# Patient Record
Sex: MALE | Employment: FULL TIME | URBAN - METROPOLITAN AREA
[De-identification: names, ages, dates, MRNs, and addresses within clinical notes are randomized per-mention and may not be internally consistent; named-entity substitution may affect disease eponyms.]

---

## 2023-10-27 ENCOUNTER — LAB (OUTPATIENT)
Dept: LAB | Facility: LAB | Age: 43
End: 2023-10-27
Payer: COMMERCIAL

## 2023-10-27 ENCOUNTER — TELEPHONE (OUTPATIENT)
Dept: PRIMARY CARE | Facility: CLINIC | Age: 43
End: 2023-10-27

## 2023-10-27 DIAGNOSIS — L02.91 ABSCESS: ICD-10-CM

## 2023-10-27 DIAGNOSIS — R10.9 ABDOMINAL PAIN, UNSPECIFIED ABDOMINAL LOCATION: ICD-10-CM

## 2023-10-27 DIAGNOSIS — R10.9 ABDOMINAL PAIN, UNSPECIFIED ABDOMINAL LOCATION: Primary | ICD-10-CM

## 2023-10-27 LAB
ALBUMIN SERPL BCP-MCNC: 4.9 G/DL (ref 3.4–5)
ALP SERPL-CCNC: 78 U/L (ref 33–120)
ALT SERPL W P-5'-P-CCNC: 51 U/L (ref 10–52)
ANION GAP SERPL CALC-SCNC: 12 MMOL/L (ref 10–20)
AST SERPL W P-5'-P-CCNC: 33 U/L (ref 9–39)
BASOPHILS # BLD AUTO: 0.06 X10*3/UL (ref 0–0.1)
BASOPHILS NFR BLD AUTO: 0.8 %
BILIRUB SERPL-MCNC: 0.8 MG/DL (ref 0–1.2)
BUN SERPL-MCNC: 13 MG/DL (ref 6–23)
CALCIUM SERPL-MCNC: 10.1 MG/DL (ref 8.6–10.6)
CHLORIDE SERPL-SCNC: 103 MMOL/L (ref 98–107)
CO2 SERPL-SCNC: 29 MMOL/L (ref 21–32)
CREAT SERPL-MCNC: 1.15 MG/DL (ref 0.5–1.3)
CRP SERPL-MCNC: 4.7 MG/DL
EOSINOPHIL # BLD AUTO: 0.13 X10*3/UL (ref 0–0.7)
EOSINOPHIL NFR BLD AUTO: 1.7 %
ERYTHROCYTE [DISTWIDTH] IN BLOOD BY AUTOMATED COUNT: 11.7 % (ref 11.5–14.5)
GFR SERPL CREATININE-BSD FRML MDRD: 81 ML/MIN/1.73M*2
GLUCOSE SERPL-MCNC: 107 MG/DL (ref 74–99)
HCT VFR BLD AUTO: 45.5 % (ref 41–52)
HGB BLD-MCNC: 15.5 G/DL (ref 13.5–17.5)
IMM GRANULOCYTES # BLD AUTO: 0.03 X10*3/UL (ref 0–0.7)
IMM GRANULOCYTES NFR BLD AUTO: 0.4 % (ref 0–0.9)
LYMPHOCYTES # BLD AUTO: 1.47 X10*3/UL (ref 1.2–4.8)
LYMPHOCYTES NFR BLD AUTO: 18.7 %
MCH RBC QN AUTO: 30.8 PG (ref 26–34)
MCHC RBC AUTO-ENTMCNC: 34.1 G/DL (ref 32–36)
MCV RBC AUTO: 90 FL (ref 80–100)
MONOCYTES # BLD AUTO: 0.78 X10*3/UL (ref 0.1–1)
MONOCYTES NFR BLD AUTO: 9.9 %
NEUTROPHILS # BLD AUTO: 5.39 X10*3/UL (ref 1.2–7.7)
NEUTROPHILS NFR BLD AUTO: 68.5 %
NRBC BLD-RTO: 0 /100 WBCS (ref 0–0)
PLATELET # BLD AUTO: 319 X10*3/UL (ref 150–450)
PMV BLD AUTO: 9.6 FL (ref 7.5–11.5)
POTASSIUM SERPL-SCNC: 5.1 MMOL/L (ref 3.5–5.3)
PROT SERPL-MCNC: 7.5 G/DL (ref 6.4–8.2)
RBC # BLD AUTO: 5.04 X10*6/UL (ref 4.5–5.9)
SODIUM SERPL-SCNC: 139 MMOL/L (ref 136–145)
WBC # BLD AUTO: 7.9 X10*3/UL (ref 4.4–11.3)

## 2023-10-27 PROCEDURE — 36415 COLL VENOUS BLD VENIPUNCTURE: CPT

## 2023-10-27 PROCEDURE — 86140 C-REACTIVE PROTEIN: CPT

## 2023-10-27 PROCEDURE — 85025 COMPLETE CBC W/AUTO DIFF WBC: CPT

## 2023-10-27 PROCEDURE — 80053 COMPREHEN METABOLIC PANEL: CPT

## 2023-10-27 NOTE — TELEPHONE ENCOUNTER
Pt called for blood work and appt  He previously had a liver infection and almost . He is experiencing the same symptoms now and would like a blood test asap. He is already fasting in hopes in can happen this morning.   Marck [phone]

## 2023-10-27 NOTE — TELEPHONE ENCOUNTER
Spoke with pt and he is aware his non fasting orders are in. His symptoms are night sweats that last a few hours, super tired and this happens out of the blue.   Pt stated these are the same symptoms from 3 years ago.

## 2023-10-30 DIAGNOSIS — R79.82 ELEVATED C-REACTIVE PROTEIN: ICD-10-CM

## 2023-10-30 DIAGNOSIS — Z00.00 HEALTH CARE MAINTENANCE: Primary | ICD-10-CM

## 2023-10-31 NOTE — TELEPHONE ENCOUNTER
Spoke with pt regarding c protein and informed him the sleep study will be discussed at his follow up visit.

## 2023-12-08 ENCOUNTER — OFFICE VISIT (OUTPATIENT)
Dept: PRIMARY CARE | Facility: CLINIC | Age: 43
End: 2023-12-08
Payer: COMMERCIAL

## 2023-12-08 VITALS
DIASTOLIC BLOOD PRESSURE: 90 MMHG | HEIGHT: 74 IN | RESPIRATION RATE: 16 BRPM | SYSTOLIC BLOOD PRESSURE: 120 MMHG | BODY MASS INDEX: 30.11 KG/M2 | WEIGHT: 234.6 LBS

## 2023-12-08 DIAGNOSIS — D49.2 NEOPLASM OF SKIN OF SCALP: ICD-10-CM

## 2023-12-08 DIAGNOSIS — L20.82 FLEXURAL ECZEMA: ICD-10-CM

## 2023-12-08 DIAGNOSIS — R06.83 SNORING: ICD-10-CM

## 2023-12-08 DIAGNOSIS — R79.82 ELEVATED C-REACTIVE PROTEIN: ICD-10-CM

## 2023-12-08 DIAGNOSIS — R73.09 ELEVATED GLUCOSE: ICD-10-CM

## 2023-12-08 DIAGNOSIS — H01.139 ECZEMA OF EYELID, UNSPECIFIED LATERALITY: ICD-10-CM

## 2023-12-08 DIAGNOSIS — G47.19 EXCESSIVE DAYTIME SLEEPINESS: ICD-10-CM

## 2023-12-08 DIAGNOSIS — Z00.00 HEALTH CARE MAINTENANCE: Primary | ICD-10-CM

## 2023-12-08 DIAGNOSIS — Z00.00 ENCOUNTER FOR PREVENTATIVE ADULT HEALTH CARE EXAMINATION: ICD-10-CM

## 2023-12-08 DIAGNOSIS — M62.838 MUSCLE SPASM: ICD-10-CM

## 2023-12-08 DIAGNOSIS — D49.2 NEOPLASM OF SKIN OF BACK: ICD-10-CM

## 2023-12-08 PROCEDURE — 99396 PREV VISIT EST AGE 40-64: CPT | Performed by: INTERNAL MEDICINE

## 2023-12-08 RX ORDER — TRIAMCINOLONE ACETONIDE 0.25 MG/G
OINTMENT TOPICAL
Qty: 80 G | Refills: 0 | Status: SHIPPED | OUTPATIENT
Start: 2023-12-08

## 2023-12-08 RX ORDER — CYCLOBENZAPRINE HCL 10 MG
10 TABLET ORAL 2 TIMES DAILY PRN
Qty: 60 TABLET | Refills: 0 | Status: SHIPPED | OUTPATIENT
Start: 2023-12-08

## 2023-12-08 ASSESSMENT — PAIN SCALES - GENERAL: PAINLEVEL: 0-NO PAIN

## 2023-12-08 ASSESSMENT — PATIENT HEALTH QUESTIONNAIRE - PHQ9
SUM OF ALL RESPONSES TO PHQ9 QUESTIONS 1 AND 2: 0
2. FEELING DOWN, DEPRESSED OR HOPELESS: NOT AT ALL
1. LITTLE INTEREST OR PLEASURE IN DOING THINGS: NOT AT ALL

## 2023-12-08 NOTE — PATIENT INSTRUCTIONS
Use triamcinolone ointment as needed for itching when it flares up.     For prevention/control: Can use a zinc oxide ointment to that area ( Desitin) and use as needed.  Is a skin protectant and helps to relieve inflammation and also kills bacteria, etc.     Get blood test done after fasting : cholesterol, sugar recheck, and recheck the c reactive protien.     Schedule with dermatology. Be sure to discuss your groin and eyelid issues.    I recommend getting a blood pressure monitor and checking periodically at home.  Goal is between 120-130  for systolic (top) and 80-85 for diastolic ( bottom).    See me one year for annual physical.

## 2023-12-08 NOTE — PROGRESS NOTES
"Subjective   Marck Crocker is a 43 y.o. male who presents for Annual physical     He had sudden onset of chills, fatigue and achiness ( joint aching ) and was concerned because this was similar to a prior liver abscess. 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   He does snore loudly. We discussed testing for sleep apnea , and that JESSICA can contribute to hypertension.    Concern over some moles on scalp and back    He has been having a persistent itch in his right groin crease   He is not scratching at night, but will have flares   Tried otc hydrocortisone, tinactin,  antibacterial soap  He does not have a rash right now  He also notes itching and redness of his eyelids and some skin redness , he does not have any of this right now    No chest pain  No shortness of breath  No constipation  No diarrhea  Review of Systems    Objective   /88 (BP Location: Right arm, Patient Position: Sitting, BP Cuff Size: Adult)   Resp 16   Ht 1.88 m (6' 2\")   Wt 106 kg (234 lb 9.6 oz)   BMI 30.12 kg/m²    Physical Exam  Recheck BP with manual cuff is 120/90  GEN: NAD  HEENT: normal  NECK: no adenopathy, no thyroid enlargment  LUNGS: CTAB  CV: reg S1/S2 no murmurs  ABD: soft, nontender, no mass or organomegaly  SKIN: he has several moles on his back, some flat red/pearly moles.  Also some scaly areas on scalp.  Some redness / bumps in his inguinal creases  EXT: no leg edema   Assessment/Plan   Problem List Items Addressed This Visit    None    Encounter for preventive health exam: his glucose was elevated at 107; Aic " in 2020 was 5.4. Check lipids, recheck fasting glucose and A1c.    Elevated blood pressure w/o htn: discussed need for exercise and wt loss, lower sodium diet.  Recommend checking BP at home and recording . He should call  if his BP are consistently elevated. Discussed getting sleep study but he did not want to do this at this time. Check cmp and lipids, TSH  Flexural eczema:  rx for triamcinolone to use for his groin itching. Also recommend zinc oxide ointment as skin protectant in those areas, this is otc.  Snoring: he didn't want s sleep study.   Scalp and back skin lesions: refer to dermatology.   He travels often for work and wishes to see me again in one year.

## 2024-01-24 ENCOUNTER — CLINICAL SUPPORT (OUTPATIENT)
Dept: SLEEP MEDICINE | Facility: HOSPITAL | Age: 44
End: 2024-01-24
Payer: COMMERCIAL

## 2024-01-24 DIAGNOSIS — R06.83 SNORING: ICD-10-CM

## 2024-01-24 DIAGNOSIS — G47.19 EXCESSIVE DAYTIME SLEEPINESS: ICD-10-CM

## 2024-01-24 PROCEDURE — 95806 SLEEP STUDY UNATT&RESP EFFT: CPT | Performed by: ALLERGY & IMMUNOLOGY

## 2024-02-08 DIAGNOSIS — G47.33 OBSTRUCTIVE SLEEP APNEA: Primary | ICD-10-CM

## 2024-11-26 ENCOUNTER — OFFICE VISIT (OUTPATIENT)
Dept: URGENT CARE | Age: 44
End: 2024-11-26
Payer: COMMERCIAL

## 2024-11-26 VITALS
TEMPERATURE: 97.3 F | OXYGEN SATURATION: 95 % | WEIGHT: 235 LBS | DIASTOLIC BLOOD PRESSURE: 92 MMHG | HEART RATE: 75 BPM | SYSTOLIC BLOOD PRESSURE: 137 MMHG | BODY MASS INDEX: 30.17 KG/M2 | RESPIRATION RATE: 16 BRPM

## 2024-11-26 DIAGNOSIS — J34.9 SINUS PROBLEM: ICD-10-CM

## 2024-11-26 DIAGNOSIS — J01.00 ACUTE NON-RECURRENT MAXILLARY SINUSITIS: Primary | ICD-10-CM

## 2024-11-26 RX ORDER — BENZONATATE 100 MG/1
CAPSULE ORAL
Qty: 60 CAPSULE | Refills: 0 | Status: SHIPPED | OUTPATIENT
Start: 2024-11-26

## 2024-11-26 RX ORDER — AMOXICILLIN AND CLAVULANATE POTASSIUM 875; 125 MG/1; MG/1
TABLET, FILM COATED ORAL
Qty: 14 TABLET | Refills: 0 | Status: SHIPPED | OUTPATIENT
Start: 2024-11-26

## 2024-11-26 ASSESSMENT — ENCOUNTER SYMPTOMS
COUGH: 1
EYES NEGATIVE: 1
ENDOCRINE NEGATIVE: 1
ALLERGIC/IMMUNOLOGIC NEGATIVE: 1
SINUS PRESSURE: 1
CONSTITUTIONAL NEGATIVE: 1
GASTROINTESTINAL NEGATIVE: 1
PSYCHIATRIC NEGATIVE: 1
RHINORRHEA: 1
MUSCULOSKELETAL NEGATIVE: 1
HEMATOLOGIC/LYMPHATIC NEGATIVE: 1
NEUROLOGICAL NEGATIVE: 1
CARDIOVASCULAR NEGATIVE: 1

## 2024-11-26 NOTE — PROGRESS NOTES
Subjective   Patient ID: Marck Crocker is a 44 y.o. male. They present today with a chief complaint of Sinus Problem.    History of Present Illness  Patient is a 43 y/o male presenting with nasal congestion/drainage, sinus pressure, dry cough x3 weeks. Patient denies symptoms of fever, chills, bodyaches, lethargy, weakness, chest pain/tightness/pressure, SOB, wheezing, N/V/D, ABD pain. No OTC medication reported to be taken.           Past Medical History  Allergies as of 11/26/2024 - Reviewed 11/26/2024   Allergen Reaction Noted    Cefaclor Hives 11/25/2015    Penicillins Hives 11/25/2015       (Not in a hospital admission)       Past Medical History:   Diagnosis Date    Abscess of liver ([facility])     Abscess, liver       Past Surgical History:   Procedure Laterality Date    OTHER SURGICAL HISTORY  01/14/2021    No history of surgery        reports that he has been smoking cigars. He has never used smokeless tobacco. He reports that he does not use drugs.    Review of Systems  Review of Systems   Constitutional: Negative.    HENT:  Positive for congestion, rhinorrhea and sinus pressure.    Eyes: Negative.    Respiratory:  Positive for cough.    Cardiovascular: Negative.    Gastrointestinal: Negative.    Endocrine: Negative.    Genitourinary: Negative.    Musculoskeletal: Negative.    Skin: Negative.    Allergic/Immunologic: Negative.    Neurological: Negative.    Hematological: Negative.    Psychiatric/Behavioral: Negative.                                    Objective    Vitals:    11/26/24 0802   BP: (!) 137/92   Pulse: 75   Resp: 16   Temp: 36.3 °C (97.3 °F)   SpO2: 95%   Weight: 107 kg (235 lb)     No LMP for male patient.    Physical Exam  Constitutional:       Comments: Patient A/O x4, LOC 5, calm and cooperative. Patient self-ambulatory to treatment area and is in no acute distress.     HENT:      Head: Normocephalic and atraumatic.      Right Ear: Tympanic membrane normal.      Left Ear: Tympanic membrane  normal.      Nose:      Comments: Bilateral inferior turbinates edematous and erythematous with moderate amounts of purulent drainage from nares. Bilateral maxillary sinus pressure to palpation       Mouth/Throat:      Comments: Posterior pharynx erythematous without tonsillar enlargement or exudates. Uvula midline. No petechiae to palates.   Eyes:      Conjunctiva/sclera: Conjunctivae normal.      Pupils: Pupils are equal, round, and reactive to light.   Cardiovascular:      Rate and Rhythm: Normal rate and regular rhythm.      Pulses: Normal pulses.      Heart sounds: Normal heart sounds.   Pulmonary:      Effort: Pulmonary effort is normal.      Breath sounds: Normal breath sounds.   Abdominal:      General: Abdomen is flat.      Palpations: Abdomen is soft.   Musculoskeletal:         General: Normal range of motion.      Cervical back: Neck supple.   Skin:     General: Skin is warm and dry.      Capillary Refill: Capillary refill takes less than 2 seconds.   Neurological:      General: No focal deficit present.      Mental Status: He is oriented to person, place, and time.   Psychiatric:         Mood and Affect: Mood normal.         Behavior: Behavior normal.         Procedures    Point of Care Test & Imaging Results from this visit  No results found for this visit on 11/26/24.   No results found.    Diagnostic study results (if any) were reviewed by JO-ANN Isaac.    Assessment/Plan   Allergies, medications, history, and pertinent labs/EKGs/Imaging reviewed by JO-ANN Isaac.     Medical Decision Making  Patient with sinus symptoms x3 weeks; will send Rx of Augmentin and Tessalon Perles for suspected maxillary sinusitis.    At time of discharge, patient was clinically well-appearing and appropriate for outpatient management. The patient/parent/guardian was educated regarding diagnosis, supportive care, OTC and Rx medications. The patient/parent/guardian was given the opportunity to ask questions  prior to discharge. They verbalized understanding of discussion of treatment plan, expected course of illness and/or injury, indications on when to return to , when to seek further evaluation in ED/call 911, and the need to follow up with PCP and/or specialist as referred. Patient/parent/guardian was provided with work/school documentation if requested. Patient stable upon discharge.     Orders and Diagnoses  Diagnoses and all orders for this visit:  Acute non-recurrent maxillary sinusitis  -     amoxicillin-pot clavulanate (Augmentin) 875-125 mg tablet; Take 1 tablet by mouth twice daily x7 days. Take with food  -     benzonatate (Tessalon) 100 mg capsule; Take 1-2 capsules by mouth every 8 hours as needed for cough. May cause drowsiness  Sinus problem      Medical Admin Record      Patient disposition: Home    Electronically signed by JO-ANN Isaac  8:26 AM

## 2024-12-09 ENCOUNTER — APPOINTMENT (OUTPATIENT)
Dept: PRIMARY CARE | Facility: CLINIC | Age: 44
End: 2024-12-09
Payer: COMMERCIAL

## 2024-12-09 VITALS
WEIGHT: 238.2 LBS | BODY MASS INDEX: 30.57 KG/M2 | HEART RATE: 72 BPM | SYSTOLIC BLOOD PRESSURE: 136 MMHG | HEIGHT: 74 IN | RESPIRATION RATE: 16 BRPM | DIASTOLIC BLOOD PRESSURE: 80 MMHG

## 2024-12-09 DIAGNOSIS — Z00.00 ROUTINE ADULT HEALTH MAINTENANCE: Primary | ICD-10-CM

## 2024-12-09 DIAGNOSIS — K63.5 POLYP OF SIGMOID COLON, UNSPECIFIED TYPE: ICD-10-CM

## 2024-12-09 DIAGNOSIS — Z00.00 ENCOUNTER FOR PREVENTATIVE ADULT HEALTH CARE EXAMINATION: ICD-10-CM

## 2024-12-09 DIAGNOSIS — K62.5 RECTAL BLEEDING: ICD-10-CM

## 2024-12-09 DIAGNOSIS — Z13.220 SCREENING FOR HYPERLIPIDEMIA: ICD-10-CM

## 2024-12-09 DIAGNOSIS — R73.09 ELEVATED GLUCOSE: ICD-10-CM

## 2024-12-09 PROCEDURE — 3008F BODY MASS INDEX DOCD: CPT | Performed by: INTERNAL MEDICINE

## 2024-12-09 PROCEDURE — 99396 PREV VISIT EST AGE 40-64: CPT | Performed by: INTERNAL MEDICINE

## 2024-12-09 ASSESSMENT — ENCOUNTER SYMPTOMS
DYSURIA: 0
RECTAL PAIN: 1
ROS GI COMMENTS: SEE HPI
PALPITATIONS: 0

## 2024-12-09 ASSESSMENT — PAIN SCALES - GENERAL: PAINLEVEL_OUTOF10: 2

## 2024-12-09 NOTE — PROGRESS NOTES
"Subjective   Patient ID: Marck Crocker is a 44 y.o. male who presents for Annual Exam.    HPI   He AVA estrada for his annual physical    He is currently on Augmentin for a sinus infeciton   At his last visit he complained of rash: it resovled in a few days after beginning cream    Says that in Feb he had a very large BM that he thinks tore his rectum some   He said it was the diameter of a softball   He had bleeding especially in the first few months after that   He used metamucil to keep the stool softer as much as possible   He said he happened to be in [place] and used some of his father's ointments for a rectal fissure that he had  He denies any constipation   He said he will very rarely have bleeding now, but he does not have pain with it    He has had two colonoscopies so far.     His sleep study was borderline  He did not see sleep medicine  He says that his sleep is improved since then  He days that he has a hard time getting used to it when the time changes    Review of Systems   Eyes:         He does not wear glasses or contacts   Cardiovascular:  Negative for chest pain and palpitations.   Gastrointestinal:  Positive for rectal pain.        See HPI   Genitourinary:  Negative for dysuria.         Current Outpatient Medications:     amoxicillin-pot clavulanate (Augmentin) 875-125 mg tablet, Take 1 tablet by mouth twice daily x7 days. Take with food, Disp: 14 tablet, Rfl: 0    benzonatate (Tessalon) 100 mg capsule, Take 1-2 capsules by mouth every 8 hours as needed for cough. May cause drowsiness, Disp: 60 capsule, Rfl: 0    cyclobenzaprine (Flexeril) 10 mg tablet, Take 1 tablet (10 mg) by mouth 2 times a day as needed for muscle spasms., Disp: 60 tablet, Rfl: 0    triamcinolone (Kenalog) 0.025 % ointment, Apply twice daily as needed., Disp: 80 g, Rfl: 0    Objective   /80   Pulse 72   Resp 16   Ht 1.88 m (6' 2\")   Wt 108 kg (238 lb 3.2 oz)   BMI 30.58 kg/m²     Physical Exam  Constitutional:       " Appearance: Normal appearance.   HENT:      Right Ear: Tympanic membrane, ear canal and external ear normal.      Left Ear: Tympanic membrane, ear canal and external ear normal.      Mouth/Throat:      Mouth: Mucous membranes are moist.      Pharynx: Oropharynx is clear.   Eyes:      Conjunctiva/sclera: Conjunctivae normal.      Pupils: Pupils are equal, round, and reactive to light.   Cardiovascular:      Rate and Rhythm: Normal rate and regular rhythm.      Heart sounds: Normal heart sounds.   Pulmonary:      Effort: Pulmonary effort is normal.      Breath sounds: Normal breath sounds.   Abdominal:      General: Bowel sounds are normal. There is no distension.      Palpations: Abdomen is soft. There is no mass.      Tenderness: There is no abdominal tenderness.   Lymphadenopathy:      Cervical: No cervical adenopathy.   Skin:     General: Skin is warm and dry.   Neurological:      General: No focal deficit present.       Assessment/Plan     #1 encounter for preventive healthcare visit: Ordered CMP, CBC, lipid profile for health maintenance.  He would like to see if he could have a colonoscopy again in 2025; his last one was in July 2020 by Dr. Robertson.  He is no longer with [facility], so I will refer him to Dr. Maynard.  She could also address the rectal fissure issue.  He did not want a prescription of anything for the rectal fissure.  Blood pressure was borderline.  I do not have a prior lipid profile on him.  He should see me again in 1 year for annual physical or sooner if needed.

## 2024-12-09 NOTE — PATIENT INSTRUCTIONS
Get blood test done after fasting overnight ( 10-12 hours)   Water is ok  The  lab is on the first floor.  Lab hours are 7 am to 4 pm Mon-Fri and 8am to Noon on Saturday.  No appt is needed.  Orders are entered into the system so you do not need a paper order.     Schedule with Dr Maynard, surgery at [facility], for colonoscopy and rectal fissure.     See me again in one year for annual physical.

## 2025-09-11 ENCOUNTER — APPOINTMENT (OUTPATIENT)
Dept: PRIMARY CARE | Facility: CLINIC | Age: 45
End: 2025-09-11
Payer: COMMERCIAL

## 2025-12-11 ENCOUNTER — APPOINTMENT (OUTPATIENT)
Dept: PRIMARY CARE | Facility: CLINIC | Age: 45
End: 2025-12-11
Payer: COMMERCIAL